# Patient Record
Sex: FEMALE | Race: BLACK OR AFRICAN AMERICAN | NOT HISPANIC OR LATINO
[De-identification: names, ages, dates, MRNs, and addresses within clinical notes are randomized per-mention and may not be internally consistent; named-entity substitution may affect disease eponyms.]

---

## 2016-08-26 VITALS — BODY MASS INDEX: 42.21 KG/M2 | WEIGHT: 229.4 LBS | HEIGHT: 62 IN

## 2016-11-17 VITALS — WEIGHT: 221 LBS | BODY MASS INDEX: 40.42 KG/M2

## 2016-12-22 VITALS — BODY MASS INDEX: 38.41 KG/M2 | WEIGHT: 210 LBS

## 2017-02-02 VITALS — BODY MASS INDEX: 36.21 KG/M2 | WEIGHT: 198 LBS

## 2018-05-22 ENCOUNTER — APPOINTMENT (OUTPATIENT)
Dept: BARIATRICS | Facility: CLINIC | Age: 53
End: 2018-05-22

## 2019-05-30 ENCOUNTER — APPOINTMENT (OUTPATIENT)
Dept: BARIATRICS | Facility: CLINIC | Age: 54
End: 2019-05-30
Payer: COMMERCIAL

## 2019-05-30 VITALS
BODY MASS INDEX: 39.66 KG/M2 | WEIGHT: 212.8 LBS | SYSTOLIC BLOOD PRESSURE: 137 MMHG | DIASTOLIC BLOOD PRESSURE: 84 MMHG | HEIGHT: 61.4 IN | HEART RATE: 75 BPM

## 2019-05-30 DIAGNOSIS — Z98.84 BARIATRIC SURGERY STATUS: ICD-10-CM

## 2019-05-30 PROCEDURE — 99213 OFFICE O/P EST LOW 20 MIN: CPT

## 2019-05-30 PROCEDURE — 97803 MED NUTRITION INDIV SUBSEQ: CPT

## 2019-06-25 ENCOUNTER — RESULT REVIEW (OUTPATIENT)
Age: 54
End: 2019-06-25

## 2019-07-23 ENCOUNTER — CLINICAL ADVICE (OUTPATIENT)
Age: 54
End: 2019-07-23

## 2019-10-04 ENCOUNTER — APPOINTMENT (OUTPATIENT)
Dept: BARIATRICS | Facility: CLINIC | Age: 54
End: 2019-10-04
Payer: COMMERCIAL

## 2019-10-04 VITALS
WEIGHT: 219.6 LBS | HEART RATE: 69 BPM | HEIGHT: 61.4 IN | SYSTOLIC BLOOD PRESSURE: 143 MMHG | BODY MASS INDEX: 40.93 KG/M2 | DIASTOLIC BLOOD PRESSURE: 84 MMHG

## 2019-10-04 PROCEDURE — 99213 OFFICE O/P EST LOW 20 MIN: CPT

## 2019-10-21 ENCOUNTER — TRANSCRIPTION ENCOUNTER (OUTPATIENT)
Age: 54
End: 2019-10-21

## 2019-10-21 ENCOUNTER — APPOINTMENT (OUTPATIENT)
Dept: BARIATRICS | Facility: CLINIC | Age: 54
End: 2019-10-21
Payer: COMMERCIAL

## 2019-10-21 PROCEDURE — 97802 MEDICAL NUTRITION INDIV IN: CPT

## 2019-10-31 ENCOUNTER — APPOINTMENT (OUTPATIENT)
Dept: BARIATRICS | Facility: CLINIC | Age: 54
End: 2019-10-31
Payer: COMMERCIAL

## 2019-10-31 VITALS
SYSTOLIC BLOOD PRESSURE: 132 MMHG | HEIGHT: 61.4 IN | BODY MASS INDEX: 40.63 KG/M2 | WEIGHT: 218 LBS | DIASTOLIC BLOOD PRESSURE: 84 MMHG | HEART RATE: 60 BPM

## 2019-10-31 PROCEDURE — 99213 OFFICE O/P EST LOW 20 MIN: CPT

## 2019-10-31 NOTE — HISTORY OF PRESENT ILLNESS
[de-identified] : Cirilo is a 53 year old female returns today with morbid obesity. She is around 30 months s/p sleeve with less weight loss than ideal. Has been complaint with all follow up and routines. Upper GI and EGD performed, no Barrent's. Feel best management is to convert to single anastomosis DS. Understands need for lifetime vitamin supplementation. Will continue to educate and schedule once insurance clearance obtained.

## 2019-11-14 ENCOUNTER — APPOINTMENT (OUTPATIENT)
Dept: BARIATRICS | Facility: HOSPITAL | Age: 54
End: 2019-11-14

## 2019-11-18 ENCOUNTER — CLINICAL ADVICE (OUTPATIENT)
Age: 54
End: 2019-11-18

## 2019-12-04 ENCOUNTER — APPOINTMENT (OUTPATIENT)
Dept: BARIATRICS | Facility: CLINIC | Age: 54
End: 2019-12-04
Payer: COMMERCIAL

## 2019-12-04 PROCEDURE — 97803 MED NUTRITION INDIV SUBSEQ: CPT

## 2019-12-05 ENCOUNTER — APPOINTMENT (OUTPATIENT)
Dept: BARIATRICS | Facility: CLINIC | Age: 54
End: 2019-12-05
Payer: COMMERCIAL

## 2019-12-05 VITALS
WEIGHT: 202 LBS | HEIGHT: 61.4 IN | SYSTOLIC BLOOD PRESSURE: 107 MMHG | BODY MASS INDEX: 37.65 KG/M2 | HEART RATE: 65 BPM | DIASTOLIC BLOOD PRESSURE: 74 MMHG

## 2019-12-05 PROCEDURE — 99024 POSTOP FOLLOW-UP VISIT: CPT

## 2019-12-06 ENCOUNTER — APPOINTMENT (OUTPATIENT)
Dept: BARIATRICS | Facility: CLINIC | Age: 54
End: 2019-12-06

## 2020-01-30 ENCOUNTER — APPOINTMENT (OUTPATIENT)
Dept: BARIATRICS | Facility: CLINIC | Age: 55
End: 2020-01-30

## 2020-04-20 ENCOUNTER — APPOINTMENT (OUTPATIENT)
Dept: BARIATRICS | Facility: CLINIC | Age: 55
End: 2020-04-20
Payer: COMMERCIAL

## 2020-04-20 VITALS — WEIGHT: 188 LBS | BODY MASS INDEX: 35.06 KG/M2

## 2020-04-20 PROCEDURE — 99213 OFFICE O/P EST LOW 20 MIN: CPT | Mod: 95

## 2020-04-26 ENCOUNTER — MESSAGE (OUTPATIENT)
Age: 55
End: 2020-04-26

## 2020-05-04 ENCOUNTER — APPOINTMENT (OUTPATIENT)
Dept: BARIATRICS | Facility: CLINIC | Age: 55
End: 2020-05-04
Payer: COMMERCIAL

## 2020-05-04 LAB
SARS-COV-2 IGG SERPL IA-ACNC: 1.2 RATIO
SARS-COV-2 IGG SERPL QL IA: POSITIVE

## 2020-05-04 PROCEDURE — 97803 MED NUTRITION INDIV SUBSEQ: CPT | Mod: 95

## 2020-05-21 ENCOUNTER — APPOINTMENT (OUTPATIENT)
Dept: BARIATRICS | Facility: CLINIC | Age: 55
End: 2020-05-21
Payer: COMMERCIAL

## 2020-05-21 VITALS — BODY MASS INDEX: 34.5 KG/M2 | WEIGHT: 185 LBS

## 2020-05-21 PROCEDURE — 99213 OFFICE O/P EST LOW 20 MIN: CPT | Mod: 95

## 2022-11-15 DIAGNOSIS — E66.01 MORBID (SEVERE) OBESITY DUE TO EXCESS CALORIES: ICD-10-CM

## 2022-11-15 DIAGNOSIS — Z98.84 BARIATRIC SURGERY STATUS: ICD-10-CM

## 2022-12-01 ENCOUNTER — APPOINTMENT (OUTPATIENT)
Dept: BARIATRICS | Facility: CLINIC | Age: 57
End: 2022-12-01
Payer: COMMERCIAL

## 2022-12-01 VITALS — WEIGHT: 187 LBS | BODY MASS INDEX: 34.87 KG/M2

## 2022-12-01 PROCEDURE — 97803 MED NUTRITION INDIV SUBSEQ: CPT

## 2023-01-09 ENCOUNTER — APPOINTMENT (OUTPATIENT)
Dept: BARIATRICS | Facility: CLINIC | Age: 58
End: 2023-01-09
Payer: COMMERCIAL

## 2023-01-09 PROCEDURE — 97803 MED NUTRITION INDIV SUBSEQ: CPT | Mod: 95

## 2023-02-09 ENCOUNTER — APPOINTMENT (OUTPATIENT)
Dept: BARIATRICS/WEIGHT MGMT | Facility: CLINIC | Age: 58
End: 2023-02-09

## 2023-11-09 ENCOUNTER — TRANSCRIPTION ENCOUNTER (OUTPATIENT)
Age: 58
End: 2023-11-09

## 2023-12-12 ENCOUNTER — TRANSCRIPTION ENCOUNTER (OUTPATIENT)
Age: 58
End: 2023-12-12

## 2024-02-14 ENCOUNTER — TRANSCRIPTION ENCOUNTER (OUTPATIENT)
Age: 59
End: 2024-02-14

## 2024-03-19 ENCOUNTER — TRANSCRIPTION ENCOUNTER (OUTPATIENT)
Age: 59
End: 2024-03-19

## 2024-04-18 ENCOUNTER — TRANSCRIPTION ENCOUNTER (OUTPATIENT)
Age: 59
End: 2024-04-18

## 2024-05-28 ENCOUNTER — TRANSCRIPTION ENCOUNTER (OUTPATIENT)
Age: 59
End: 2024-05-28

## 2024-06-25 ENCOUNTER — TRANSCRIPTION ENCOUNTER (OUTPATIENT)
Age: 59
End: 2024-06-25

## 2024-07-23 ENCOUNTER — TRANSCRIPTION ENCOUNTER (OUTPATIENT)
Age: 59
End: 2024-07-23

## 2024-11-13 ENCOUNTER — TRANSCRIPTION ENCOUNTER (OUTPATIENT)
Age: 59
End: 2024-11-13